# Patient Record
(demographics unavailable — no encounter records)

---

## 2025-02-27 NOTE — PHYSICAL EXAM
[Midline] : trachea located in midline position [Normal] : assessment of respiratory effort is normal [de-identified] : mild to moderate biofilm noted to tongue

## 2025-02-27 NOTE — HISTORY OF PRESENT ILLNESS
[de-identified] : 1.5 year follow up visit for this 41 y/o M here for madi for tx of halitosis. He overall feels well today.

## 2025-02-27 NOTE — END OF VISIT
[FreeTextEntry3] : I, Dr. Daly, personally performed the evaluation and management (E/M) services for this established patient who presents today with (a) new problem(s)/exacerbation of (an) existing condition(s).  That E/M includes conducting the examination, assessing all new/exacerbated conditions, and establishing a new plan of care.  Today, my physician assistant, Tammy Horn, was here to observe my evaluation and management services for this new problem/exacerbated condition to be followed going forward.

## 2025-02-27 NOTE — PROCEDURE
[FreeTextEntry1] : madi [FreeTextEntry2] : halitosis [FreeTextEntry3] : After informed verbal consent was obtained, tongue debridement via waterlase was performed. 4x4 was used to anchor tongue while debridement was performed. At end of procedure, chlorohexidine was applied and patient was instructed to gargle with mouth wash. No complications. Patient was stable during and after the procedure.